# Patient Record
Sex: FEMALE | Race: AMERICAN INDIAN OR ALASKA NATIVE | HISPANIC OR LATINO | Employment: UNEMPLOYED | URBAN - METROPOLITAN AREA
[De-identification: names, ages, dates, MRNs, and addresses within clinical notes are randomized per-mention and may not be internally consistent; named-entity substitution may affect disease eponyms.]

---

## 2017-09-12 ENCOUNTER — GENERIC CONVERSION - ENCOUNTER (OUTPATIENT)
Dept: OTHER | Facility: OTHER | Age: 47
End: 2017-09-12

## 2017-09-12 DIAGNOSIS — Z01.419 ENCOUNTER FOR GYNECOLOGICAL EXAMINATION WITHOUT ABNORMAL FINDING: ICD-10-CM

## 2017-09-12 PROCEDURE — 87624 HPV HI-RISK TYP POOLED RSLT: CPT | Performed by: OBSTETRICS & GYNECOLOGY

## 2017-09-12 PROCEDURE — G0145 SCR C/V CYTO,THINLAYER,RESCR: HCPCS | Performed by: OBSTETRICS & GYNECOLOGY

## 2017-09-14 ENCOUNTER — LAB REQUISITION (OUTPATIENT)
Dept: LAB | Facility: HOSPITAL | Age: 47
End: 2017-09-14
Payer: COMMERCIAL

## 2017-09-14 DIAGNOSIS — Z01.419 ENCOUNTER FOR GYNECOLOGICAL EXAMINATION WITHOUT ABNORMAL FINDING: ICD-10-CM

## 2017-09-15 LAB — HPV RRNA GENITAL QL NAA+PROBE: NORMAL

## 2017-09-19 LAB
LAB AP GYN PRIMARY INTERPRETATION: NORMAL
Lab: NORMAL

## 2018-01-22 VITALS
HEIGHT: 63 IN | DIASTOLIC BLOOD PRESSURE: 70 MMHG | BODY MASS INDEX: 21.97 KG/M2 | WEIGHT: 124 LBS | SYSTOLIC BLOOD PRESSURE: 112 MMHG

## 2019-02-22 ENCOUNTER — OFFICE VISIT (OUTPATIENT)
Dept: FAMILY MEDICINE CLINIC | Facility: CLINIC | Age: 49
End: 2019-02-22

## 2019-02-22 VITALS
RESPIRATION RATE: 18 BRPM | TEMPERATURE: 97.8 F | WEIGHT: 129 LBS | OXYGEN SATURATION: 98 % | SYSTOLIC BLOOD PRESSURE: 108 MMHG | DIASTOLIC BLOOD PRESSURE: 64 MMHG | BODY MASS INDEX: 23.74 KG/M2 | HEIGHT: 62 IN | HEART RATE: 79 BPM

## 2019-02-22 DIAGNOSIS — R00.2 PALPITATIONS: ICD-10-CM

## 2019-02-22 DIAGNOSIS — R22.1 MASS OF LEFT SIDE OF NECK: Primary | ICD-10-CM

## 2019-02-22 DIAGNOSIS — M25.512 ACUTE PAIN OF LEFT SHOULDER: ICD-10-CM

## 2019-02-22 PROBLEM — Z00.00 HEALTH MAINTENANCE EXAMINATION: Status: ACTIVE | Noted: 2019-02-22

## 2019-02-22 PROCEDURE — 99212 OFFICE O/P EST SF 10 MIN: CPT | Performed by: FAMILY MEDICINE

## 2019-02-22 NOTE — PROGRESS NOTES
Assessment/Plan:           Problem List Items Addressed This Visit        Other    Mass of left side of neck - Primary    Relevant Orders    US head neck soft tissue    Ambulatory Referral to Otolaryngology    Palpitations     This happened 1 time 2 weeks ago  She will return if she has further problems  Acute pain of left shoulder     Pendulum exercises recommended                 Subjective:      Patient ID: Marybel Ferrell is a 51 y o  female  This is a new patient to our practice  She has a 2 month history of lump in her left neck  She complains of left shoulder pain  She had 1 episode of palpitations 2 weeks ago  She is otherwise in good health      The following portions of the patient's history were reviewed and updated as appropriate: allergies, current medications, past family history, past medical history, past social history, past surgical history and problem list     Review of Systems   Constitutional: Negative  HENT: Negative  Respiratory: Negative  Cardiovascular: Positive for palpitations  Negative for chest pain  Objective:      /64   Pulse 79   Temp 97 8 °F (36 6 °C)   Resp 18   Ht 5' 2" (1 575 m)   Wt 58 5 kg (129 lb)   SpO2 98%   BMI 23 59 kg/m²          Physical Exam   Constitutional: She is oriented to person, place, and time  She appears well-developed  HENT:   Head: Normocephalic and atraumatic  Right Ear: Tympanic membrane and external ear normal    Left Ear: Tympanic membrane and external ear normal    Mouth/Throat: Oropharynx is clear and moist    Eyes: Pupils are equal, round, and reactive to light  EOM are normal    Neck: Neck supple  Nodule high up in left anterior cervical chain  Possible submandibular salivary gland   Cardiovascular: Normal rate, regular rhythm and normal heart sounds  Pulmonary/Chest: Effort normal and breath sounds normal    Abdominal: Soft  Musculoskeletal: Normal range of motion     Full range of motion left shoulder  No pain on flexion or abduction  Neurological: She is alert and oriented to person, place, and time  Skin: Skin is warm and dry  Psychiatric: She has a normal mood and affect

## 2019-03-04 ENCOUNTER — HOSPITAL ENCOUNTER (OUTPATIENT)
Dept: RADIOLOGY | Facility: MEDICAL CENTER | Age: 49
Discharge: HOME | End: 2019-03-04
Payer: COMMERCIAL

## 2019-03-04 DIAGNOSIS — R22.1 MASS OF LEFT SIDE OF NECK: ICD-10-CM

## 2019-03-04 PROCEDURE — 76536 US EXAM OF HEAD AND NECK: CPT

## 2020-11-02 ENCOUNTER — OFFICE VISIT (OUTPATIENT)
Dept: FAMILY MEDICINE CLINIC | Facility: CLINIC | Age: 50
End: 2020-11-02

## 2020-11-02 ENCOUNTER — TELEPHONE (OUTPATIENT)
Dept: FAMILY MEDICINE CLINIC | Facility: CLINIC | Age: 50
End: 2020-11-02

## 2020-11-02 ENCOUNTER — LAB (OUTPATIENT)
Dept: LAB | Facility: HOSPITAL | Age: 50
End: 2020-11-02
Payer: COMMERCIAL

## 2020-11-02 VITALS
OXYGEN SATURATION: 99 % | DIASTOLIC BLOOD PRESSURE: 82 MMHG | BODY MASS INDEX: 22.04 KG/M2 | WEIGHT: 124.4 LBS | TEMPERATURE: 97.1 F | HEART RATE: 79 BPM | SYSTOLIC BLOOD PRESSURE: 126 MMHG | HEIGHT: 63 IN

## 2020-11-02 DIAGNOSIS — Z12.31 ENCOUNTER FOR SCREENING MAMMOGRAM FOR MALIGNANT NEOPLASM OF BREAST: Primary | ICD-10-CM

## 2020-11-02 DIAGNOSIS — G89.29 CHRONIC PAIN OF LEFT KNEE: ICD-10-CM
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
## 2020-11-02 DIAGNOSIS — Z13.6 SCREENING FOR CARDIOVASCULAR CONDITION: ICD-10-CM

## 2020-11-02 DIAGNOSIS — M25.562 CHRONIC PAIN OF LEFT KNEE: ICD-10-CM

## 2020-11-02 DIAGNOSIS — Z00.00 ANNUAL PHYSICAL EXAM: ICD-10-CM

## 2020-11-02 DIAGNOSIS — L65.9 HAIR LOSS: ICD-10-CM

## 2020-11-02 LAB
ANION GAP SERPL CALCULATED.3IONS-SCNC: 12 MMOL/L (ref 4–13)
BUN SERPL-MCNC: 10 MG/DL (ref 5–25)
CALCIUM SERPL-MCNC: 9.6 MG/DL (ref 8.3–10.1)
CHLORIDE SERPL-SCNC: 101 MMOL/L (ref 100–108)
CHOLEST SERPL-MCNC: 290 MG/DL (ref 50–200)
CO2 SERPL-SCNC: 27 MMOL/L (ref 21–32)
CREAT SERPL-MCNC: 0.68 MG/DL (ref 0.6–1.3)
GFR SERPL CREATININE-BSD FRML MDRD: 103 ML/MIN/1.73SQ M
GLUCOSE P FAST SERPL-MCNC: 96 MG/DL (ref 65–99)
HDLC SERPL-MCNC: 80 MG/DL
LDLC SERPL CALC-MCNC: 193 MG/DL (ref 0–100)
NONHDLC SERPL-MCNC: 210 MG/DL
POTASSIUM SERPL-SCNC: 3.8 MMOL/L (ref 3.5–5.3)
SODIUM SERPL-SCNC: 140 MMOL/L (ref 136–145)
T4 FREE SERPL-MCNC: 1.23 NG/DL (ref 0.76–1.46)
TRIGL SERPL-MCNC: 83 MG/DL
TSH SERPL DL<=0.05 MIU/L-ACNC: 3.75 UIU/ML (ref 0.36–3.74)

## 2020-11-02 PROCEDURE — 86618 LYME DISEASE ANTIBODY: CPT

## 2020-11-02 PROCEDURE — 99396 PREV VISIT EST AGE 40-64: CPT | Performed by: STUDENT IN AN ORGANIZED HEALTH CARE EDUCATION/TRAINING PROGRAM

## 2020-11-02 PROCEDURE — 84443 ASSAY THYROID STIM HORMONE: CPT

## 2020-11-02 PROCEDURE — 80048 BASIC METABOLIC PNL TOTAL CA: CPT

## 2020-11-02 PROCEDURE — 80061 LIPID PANEL: CPT

## 2020-11-02 PROCEDURE — 84439 ASSAY OF FREE THYROXINE: CPT

## 2020-11-02 PROCEDURE — 36415 COLL VENOUS BLD VENIPUNCTURE: CPT

## 2020-11-03 LAB — B BURGDOR IGG+IGM SER-ACNC: 15

## 2022-03-18 ENCOUNTER — TELEPHONE (OUTPATIENT)
Dept: FAMILY MEDICINE CLINIC | Facility: CLINIC | Age: 52
End: 2022-03-18

## 2022-03-18 NOTE — TELEPHONE ENCOUNTER
Attempted to call patient for Mammo ordering and scheduling - patient mailbox is full and can't accept messages at this time

## 2022-03-25 ENCOUNTER — TELEPHONE (OUTPATIENT)
Dept: FAMILY MEDICINE CLINIC | Facility: CLINIC | Age: 52
End: 2022-03-25

## 2022-03-25 NOTE — TELEPHONE ENCOUNTER
Called patient to notify that she is overdue for physical and colonoscopy - unable to leave voicemail - mailbox full

## 2024-01-15 ENCOUNTER — OFFICE VISIT (OUTPATIENT)
Dept: FAMILY MEDICINE CLINIC | Facility: CLINIC | Age: 54
End: 2024-01-15

## 2024-01-15 VITALS
DIASTOLIC BLOOD PRESSURE: 76 MMHG | OXYGEN SATURATION: 99 % | SYSTOLIC BLOOD PRESSURE: 118 MMHG | WEIGHT: 130 LBS | HEIGHT: 60 IN | HEART RATE: 83 BPM | BODY MASS INDEX: 25.52 KG/M2

## 2024-01-15 DIAGNOSIS — Z12.11 SCREENING FOR COLORECTAL CANCER: ICD-10-CM

## 2024-01-15 DIAGNOSIS — R20.8 ELECTRICAL SHOCK SENSATION: ICD-10-CM

## 2024-01-15 DIAGNOSIS — Z00.00 ANNUAL PHYSICAL EXAM: ICD-10-CM

## 2024-01-15 DIAGNOSIS — Z13.6 SCREENING FOR CARDIOVASCULAR CONDITION: ICD-10-CM

## 2024-01-15 DIAGNOSIS — Z12.31 ENCOUNTER FOR SCREENING MAMMOGRAM FOR BREAST CANCER: ICD-10-CM

## 2024-01-15 DIAGNOSIS — M25.539 PAIN IN WRIST, UNSPECIFIED LATERALITY: Primary | ICD-10-CM

## 2024-01-15 DIAGNOSIS — Z13.1 SCREENING FOR DIABETES MELLITUS: ICD-10-CM

## 2024-01-15 DIAGNOSIS — Z13.820 SCREENING FOR OSTEOPOROSIS: ICD-10-CM

## 2024-01-15 DIAGNOSIS — Z12.4 SCREENING FOR CERVICAL CANCER: ICD-10-CM

## 2024-01-15 DIAGNOSIS — Z12.12 SCREENING FOR COLORECTAL CANCER: ICD-10-CM

## 2024-01-15 PROBLEM — L65.9 HAIR LOSS: Status: RESOLVED | Noted: 2020-11-02 | Resolved: 2024-01-15

## 2024-01-15 PROBLEM — R22.1 MASS OF LEFT SIDE OF NECK: Status: RESOLVED | Noted: 2019-02-22 | Resolved: 2024-01-15

## 2024-01-15 PROCEDURE — 99213 OFFICE O/P EST LOW 20 MIN: CPT | Performed by: STUDENT IN AN ORGANIZED HEALTH CARE EDUCATION/TRAINING PROGRAM

## 2024-01-15 PROCEDURE — 99386 PREV VISIT NEW AGE 40-64: CPT | Performed by: STUDENT IN AN ORGANIZED HEALTH CARE EDUCATION/TRAINING PROGRAM
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
## 2024-01-22 ENCOUNTER — TELEPHONE (OUTPATIENT)
Dept: FAMILY MEDICINE CLINIC | Facility: CLINIC | Age: 54
End: 2024-01-22

## 2024-01-22 ENCOUNTER — APPOINTMENT (OUTPATIENT)
Dept: LAB | Facility: HOSPITAL | Age: 54
End: 2024-01-22

## 2024-01-22 ENCOUNTER — HOSPITAL ENCOUNTER (OUTPATIENT)
Dept: RADIOLOGY | Facility: HOSPITAL | Age: 54
Discharge: HOME | End: 2024-01-22

## 2024-01-22 DIAGNOSIS — R20.8 ELECTRICAL SHOCK SENSATION: ICD-10-CM

## 2024-01-22 DIAGNOSIS — E78.00 PURE HYPERCHOLESTEROLEMIA: Primary | ICD-10-CM

## 2024-01-22 DIAGNOSIS — M25.539 PAIN IN WRIST, UNSPECIFIED LATERALITY: ICD-10-CM

## 2024-01-22 DIAGNOSIS — Z13.1 SCREENING FOR DIABETES MELLITUS: ICD-10-CM

## 2024-01-22 DIAGNOSIS — Z13.6 SCREENING FOR CARDIOVASCULAR CONDITION: ICD-10-CM

## 2024-01-22 LAB
25(OH)D3 SERPL-MCNC: 31.4 NG/ML (ref 30–100)
ALBUMIN SERPL BCP-MCNC: 4.9 G/DL (ref 3.5–5)
ALP SERPL-CCNC: 80 U/L (ref 34–104)
ALT SERPL W P-5'-P-CCNC: 12 U/L (ref 7–52)
ANION GAP SERPL CALCULATED.3IONS-SCNC: 8 MMOL/L
AST SERPL W P-5'-P-CCNC: 16 U/L (ref 13–39)
BILIRUB SERPL-MCNC: 0.96 MG/DL (ref 0.2–1)
BUN SERPL-MCNC: 14 MG/DL (ref 5–25)
CALCIUM SERPL-MCNC: 9.9 MG/DL (ref 8.4–10.2)
CHLORIDE SERPL-SCNC: 102 MMOL/L (ref 96–108)
CHOLEST SERPL-MCNC: 340 MG/DL
CO2 SERPL-SCNC: 28 MMOL/L (ref 21–32)
CREAT SERPL-MCNC: 0.74 MG/DL (ref 0.6–1.3)
ERYTHROCYTE [DISTWIDTH] IN BLOOD BY AUTOMATED COUNT: 12 % (ref 11.6–15.1)
EST. AVERAGE GLUCOSE BLD GHB EST-MCNC: 117 MG/DL
GFR SERPL CREATININE-BSD FRML MDRD: 92 ML/MIN/1.73SQ M
GLUCOSE P FAST SERPL-MCNC: 102 MG/DL (ref 65–99)
HBA1C MFR BLD: 5.7 %
HCT VFR BLD AUTO: 42.3 % (ref 34.8–46.1)
HDLC SERPL-MCNC: 80 MG/DL
HGB BLD-MCNC: 14 G/DL (ref 11.5–15.4)
LDLC SERPL CALC-MCNC: 238 MG/DL (ref 0–100)
MCH RBC QN AUTO: 28.3 PG (ref 26.8–34.3)
MCHC RBC AUTO-ENTMCNC: 33.1 G/DL (ref 31.4–37.4)
MCV RBC AUTO: 86 FL (ref 82–98)
NONHDLC SERPL-MCNC: 260 MG/DL
PLATELET # BLD AUTO: 237 THOUSANDS/UL (ref 149–390)
PMV BLD AUTO: 10.3 FL (ref 8.9–12.7)
POTASSIUM SERPL-SCNC: 3.7 MMOL/L (ref 3.5–5.3)
PROT SERPL-MCNC: 7.8 G/DL (ref 6.4–8.4)
RBC # BLD AUTO: 4.94 MILLION/UL (ref 3.81–5.12)
SODIUM SERPL-SCNC: 138 MMOL/L (ref 135–147)
TRIGL SERPL-MCNC: 110 MG/DL
TSH SERPL DL<=0.05 MIU/L-ACNC: 3.37 UIU/ML (ref 0.45–4.5)
WBC # BLD AUTO: 7.66 THOUSAND/UL (ref 4.31–10.16)

## 2024-01-22 PROCEDURE — 80061 LIPID PANEL: CPT

## 2024-01-22 PROCEDURE — 83036 HEMOGLOBIN GLYCOSYLATED A1C: CPT

## 2024-01-22 PROCEDURE — 80053 COMPREHEN METABOLIC PANEL: CPT

## 2024-01-22 PROCEDURE — 73110 X-RAY EXAM OF WRIST: CPT

## 2024-01-22 PROCEDURE — 72050 X-RAY EXAM NECK SPINE 4/5VWS: CPT

## 2024-01-22 PROCEDURE — 36415 COLL VENOUS BLD VENIPUNCTURE: CPT

## 2024-01-22 PROCEDURE — 82306 VITAMIN D 25 HYDROXY: CPT

## 2024-01-22 PROCEDURE — 84443 ASSAY THYROID STIM HORMONE: CPT

## 2024-01-22 PROCEDURE — 85027 COMPLETE CBC AUTOMATED: CPT

## 2024-01-22 RX ORDER — ATORVASTATIN CALCIUM 20 MG/1
20 TABLET, FILM COATED ORAL DAILY
Qty: 90 TABLET | Refills: 1 | Status: SHIPPED | OUTPATIENT
Start: 2024-01-22

## 2024-01-23 ENCOUNTER — TELEPHONE (OUTPATIENT)
Dept: FAMILY MEDICINE CLINIC | Facility: CLINIC | Age: 54
End: 2024-01-23

## 2024-01-23 DIAGNOSIS — S62.113A: Primary | ICD-10-CM

## 2024-02-01 ENCOUNTER — TELEPHONE (OUTPATIENT)
Dept: FAMILY MEDICINE CLINIC | Facility: CLINIC | Age: 54
End: 2024-02-01

## 2024-02-01 DIAGNOSIS — M54.2 NECK PAIN: Primary | ICD-10-CM

## 2024-02-01 NOTE — TELEPHONE ENCOUNTER
Does not have diabetes, A1c is mildly elevated in the prediabetes category. Would only recommend dietary changes and increasing exercise to prevent progression to diabetes. Age related degenerative changes in the neck. Would encourage seeing our physical therapy team and using motrin 400mg every 8 hours as needed

## 2024-02-01 NOTE — TELEPHONE ENCOUNTER
T/c from pt's spouse -- states she received calls about her bloodwork results, but was never discussed about diabetes. Is wondering what the results are for that. Also pt had an xray on her spine and has not heard anything about the results of that. Pt asking for advisement.

## 2024-02-07 NOTE — TELEPHONE ENCOUNTER
VM from pt:    Yeah, this is Jefe Powers. I'm calling on behalf of my wife, Angie Powers. Date of birth? It's 1970. She's still waiting for two results, Osteoporosis and diabetes. I called before and we haven't got any messages, phone calls or anything from that office. So my phone number is [phone] 1/3 and I'm sure they got her number on file. If you can call me and let me know what's going on because we already got the bill but we don't have the results All right. Thank you. Shivani    **Spoke to pt's  - read Dr Shetty' advisements and he fully understood.  He will stop by to  PT orders.

## 2024-02-19 ENCOUNTER — OFFICE VISIT (OUTPATIENT)
Dept: OBGYN CLINIC | Facility: CLINIC | Age: 54
End: 2024-02-19

## 2024-02-19 VITALS
WEIGHT: 125.2 LBS | HEART RATE: 69 BPM | HEIGHT: 60 IN | SYSTOLIC BLOOD PRESSURE: 131 MMHG | BODY MASS INDEX: 24.58 KG/M2 | DIASTOLIC BLOOD PRESSURE: 72 MMHG

## 2024-02-19 DIAGNOSIS — S63.501A SPRAIN OF RIGHT WRIST, INITIAL ENCOUNTER: Primary | ICD-10-CM

## 2024-02-19 DIAGNOSIS — S56.911A STRAIN OF RIGHT FOREARM, INITIAL ENCOUNTER: ICD-10-CM

## 2024-02-19 PROCEDURE — 99202 OFFICE O/P NEW SF 15 MIN: CPT | Performed by: FAMILY MEDICINE

## 2024-02-19 NOTE — PROGRESS NOTES
Assessment/Plan:  Assessment/Plan   Diagnoses and all orders for this visit:    Sprain of right wrist, initial encounter  -     Ambulatory Referral to Sports Medicine  -     Cock Up Wrist Splint    Strain of right forearm, initial encounter      54-year-old right-hand-dominant active female with onset of right wrist and forearm pain more than 2 months duration.  Discussed with patient physical exam, radiographs, impression, and plan.  X-rays of the right wrist are unremarkable for acute osseous abnormality or significant degenerative change.  Physical exam noted for mild tenderness along the ulnar aspect of the forearm.  She has intact range of motion and strength of the wrist and digits of the hand.  There is mild pain of the forearm with resisted wrist extension arm fully extended.  She is intact neurovascularly.  Clinical impression is that she may have symptoms from sprain and strain of the wrist and forearm.  I discussed treatment regimen of topical anti-inflammatory, supplements, and home exercises.  She is to start taking turmeric vitamin at least 1000 mg daily, tart cherry vitamin at least 1000 mg daily.  She may apply topical diclofenac gel 3 times a day for 10 days.  She may wear cock-up wrist splint for the next 3 weeks.  She may do home exercise program.  She will follow-up as needed.          Subjective:   Patient ID: Angie Powers is a 54 y.o. female.  Chief Complaint   Patient presents with    Right Wrist - Pain        54-year-old right-hand-dominant female presents for evaluation of right wrist and forearm pain more than 2 months duration.  She denies trauma or inciting event.  Pain described as gradual in onset, localized to the ulnar aspect of the wrist and radiating proximally to the ulnar aspect forearm and to the level of the elbow, worse activity particularly twisting, and improved with resting.  Symptoms worsened and she was seen by primary care physician.  She was referred for x-rays,  prescribed diclofenac and referred to orthopedic care.  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    Wrist Pain  This is a new problem. The current episode started more than 1 month ago. The problem occurs daily. The problem has been gradually worsening. Associated symptoms include arthralgias. Pertinent negatives include no abdominal pain, chest pain, chills, fever, joint swelling, numbness, rash, sore throat or weakness. The symptoms are aggravated by twisting. She has tried rest for the symptoms. The treatment provided mild relief.           The following portions of the patient's history were reviewed and updated as appropriate: She  has no past medical history on file.  She has No Known Allergies..    Review of Systems   Constitutional:  Negative for chills and fever.   HENT:  Negative for sore throat.    Eyes:  Negative for visual disturbance.   Respiratory:  Negative for shortness of breath.    Cardiovascular:  Negative for chest pain.   Gastrointestinal:  Negative for abdominal pain.   Genitourinary:  Negative for flank pain.   Musculoskeletal:  Positive for arthralgias. Negative for joint swelling.   Skin:  Negative for rash and wound.   Neurological:  Negative for weakness and numbness.   Hematological:  Does not bruise/bleed easily.   Psychiatric/Behavioral:  Negative for self-injury.        Objective:  Vitals:    02/19/24 1327   BP: 131/72   Pulse: 69   Weight: 56.8 kg (125 lb 3.2 oz)   Height: 5' (1.524 m)      Right Hand Exam     Tenderness   The patient is experiencing no tenderness.     Range of Motion   The patient has normal right wrist ROM.     Muscle Strength   Wrist extension: 5/5   Wrist flexion: 5/5   : 5/5     Tests   Tinel's sign (median nerve): negative  Finkelstein's test: negative    Other   Sensation: normal  Pulse: present      Right Elbow Exam     Tenderness   Right elbow tenderness location: Ulnar aspect forearm.     Range of Motion   The patient has  normal right elbow ROM.    Muscle Strength   The patient has normal right elbow strength (5/5 flexion and extension).    Tests   Varus: negative  Valgus: negative  Tinel's sign (cubital tunnel): negative    Other   Sensation: normal          Strength/Myotome Testing     Right Wrist/Hand   Wrist extension: 5  Wrist flexion: 5    Tests     Right Wrist/Hand   Negative Finkelstein's and Tinel's sign (medial nerve).       Physical Exam  Vitals and nursing note reviewed.   Constitutional:       Appearance: Normal appearance. She is well-developed. She is not ill-appearing or diaphoretic.   HENT:      Head: Normocephalic and atraumatic.      Right Ear: External ear normal.      Left Ear: External ear normal.   Eyes:      Conjunctiva/sclera: Conjunctivae normal.   Neck:      Trachea: No tracheal deviation.   Cardiovascular:      Rate and Rhythm: Normal rate.   Pulmonary:      Effort: Pulmonary effort is normal. No respiratory distress.   Abdominal:      General: There is no distension.   Musculoskeletal:         General: No swelling or tenderness.   Skin:     General: Skin is warm and dry.      Coloration: Skin is not jaundiced or pale.   Neurological:      Mental Status: She is alert and oriented to person, place, and time.   Psychiatric:         Mood and Affect: Mood normal.         Behavior: Behavior normal.         Thought Content: Thought content normal.         Judgment: Judgment normal.         I have personally reviewed pertinent films in PACS and my interpretation is  .  No acute osseous abnormality right wrist.

## 2024-02-19 NOTE — PATIENT INSTRUCTIONS
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

## 2024-06-17 ENCOUNTER — HOSPITAL ENCOUNTER (OUTPATIENT)
Dept: MAMMOGRAPHY | Facility: CLINIC | Age: 54
Discharge: HOME | End: 2024-06-17

## 2024-06-17 DIAGNOSIS — Z12.31 ENCOUNTER FOR SCREENING MAMMOGRAM FOR BREAST CANCER: ICD-10-CM

## 2024-06-17 PROCEDURE — 77067 SCR MAMMO BI INCL CAD: CPT

## 2024-06-17 PROCEDURE — 77063 BREAST TOMOSYNTHESIS BI: CPT

## 2024-06-19 ENCOUNTER — TELEPHONE (OUTPATIENT)
Facility: HOSPITAL | Age: 54
End: 2024-06-19

## 2024-06-19 NOTE — TELEPHONE ENCOUNTER
Telephone Call    [] Called Patient regarding [facility]; Patient answered call and wants to enroll; Asked for a call back.    [] Called Patient regarding [facility]; Patient answered call; Declined to enroll in program.    [] Called Patient regarding [facility]; Patient did not ; Left voicemail with my name and direct phone number.     [] Called Patient regarding [facility]; Patient did not ; Unable to leave voicemail.    [] Called Patient regarding [facility]; Phone number is invalid    Attempts (Max 3): [x]1   []2   []3        Additional Notes:  PT answered but need time to think about enrolling in [facility] program. Pt will call back with a decision.

## 2024-06-26 ENCOUNTER — TELEPHONE (OUTPATIENT)
Facility: HOSPITAL | Age: 54
End: 2024-06-26

## 2024-07-02 ENCOUNTER — TELEPHONE (OUTPATIENT)
Facility: HOSPITAL | Age: 54
End: 2024-07-02